# Patient Record
Sex: FEMALE | Race: WHITE | Employment: OTHER | URBAN - METROPOLITAN AREA
[De-identification: names, ages, dates, MRNs, and addresses within clinical notes are randomized per-mention and may not be internally consistent; named-entity substitution may affect disease eponyms.]

---

## 2019-04-18 ENCOUNTER — APPOINTMENT (OUTPATIENT)
Dept: CT IMAGING | Age: 49
End: 2019-04-18
Payer: OTHER MISCELLANEOUS

## 2019-04-18 ENCOUNTER — HOSPITAL ENCOUNTER (EMERGENCY)
Age: 49
Discharge: HOME | End: 2019-04-18
Attending: EMERGENCY MEDICINE
Payer: OTHER MISCELLANEOUS

## 2019-04-18 VITALS
OXYGEN SATURATION: 99 % | SYSTOLIC BLOOD PRESSURE: 117 MMHG | RESPIRATION RATE: 7 BRPM | TEMPERATURE: 98.2 F | HEART RATE: 88 BPM | DIASTOLIC BLOOD PRESSURE: 70 MMHG
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
## 2019-04-18 DIAGNOSIS — S09.90XA INJURY OF HEAD, INITIAL ENCOUNTER: ICD-10-CM

## 2019-04-18 DIAGNOSIS — R40.0 DROWSINESS: ICD-10-CM

## 2019-04-18 PROCEDURE — 72125 CT NECK SPINE W/O DYE: CPT

## 2019-04-18 PROCEDURE — 2580000003 HC RX 258: Performed by: NURSE PRACTITIONER

## 2019-04-18 PROCEDURE — 99284 EMERGENCY DEPT VISIT MOD MDM: CPT

## 2019-04-18 PROCEDURE — 70450 CT HEAD/BRAIN W/O DYE: CPT

## 2019-04-18 RX ORDER — MELOXICAM 15 MG/1
15 TABLET ORAL DAILY
COMMUNITY

## 2019-04-18 RX ORDER — LORAZEPAM 1 MG/1
1 TABLET ORAL 4 TIMES DAILY
COMMUNITY

## 2019-04-18 RX ORDER — MORPHINE SULFATE 15 MG/1
15 TABLET ORAL
COMMUNITY

## 2019-04-18 RX ORDER — 0.9 % SODIUM CHLORIDE 0.9 %
1000 INTRAVENOUS SOLUTION INTRAVENOUS ONCE
Status: COMPLETED | OUTPATIENT
Start: 2019-04-18 | End: 2019-04-18

## 2019-04-18 RX ORDER — OXYMORPHONE HYDROCHLORIDE 5 MG/1
5 TABLET ORAL EVERY 8 HOURS PRN
COMMUNITY

## 2019-04-18 RX ORDER — PREDNISONE 1 MG/1
TABLET ORAL SEE ADMIN INSTRUCTIONS
COMMUNITY
Start: 2019-04-12 | End: 2019-04-20

## 2019-04-18 RX ORDER — TIZANIDINE 4 MG/1
4 TABLET ORAL EVERY 8 HOURS PRN
COMMUNITY

## 2019-04-18 RX ORDER — FAMOTIDINE 20 MG/1
20 TABLET, FILM COATED ORAL 2 TIMES DAILY
COMMUNITY

## 2019-04-18 RX ADMIN — SODIUM CHLORIDE 1000 ML: 9 INJECTION, SOLUTION INTRAVENOUS at 11:26

## 2019-04-18 ASSESSMENT — PAIN DESCRIPTION - ONSET: ONSET: ON-GOING

## 2019-04-18 ASSESSMENT — ENCOUNTER SYMPTOMS
NAUSEA: 0
COLOR CHANGE: 0
SHORTNESS OF BREATH: 0
EYE PAIN: 0
FACIAL SWELLING: 0
COUGH: 0
ABDOMINAL PAIN: 0
VOMITING: 0
BACK PAIN: 0
PHOTOPHOBIA: 0

## 2019-04-18 ASSESSMENT — PAIN SCALES - GENERAL: PAINLEVEL_OUTOF10: 3

## 2019-04-18 ASSESSMENT — PAIN DESCRIPTION - FREQUENCY: FREQUENCY: CONTINUOUS

## 2019-04-18 ASSESSMENT — PAIN DESCRIPTION - DESCRIPTORS: DESCRIPTORS: ACHING

## 2019-04-18 ASSESSMENT — PAIN DESCRIPTION - LOCATION: LOCATION: HEAD;NECK

## 2019-04-18 ASSESSMENT — PAIN DESCRIPTION - PROGRESSION: CLINICAL_PROGRESSION: NOT CHANGED

## 2019-04-18 NOTE — ED NOTES
Patient remains drowsy but responsive. Speech remains slightly slurred  But coherent. . doctor Ginna Lehman notified of patients low blood pressure and  Continued drowsiness Intravenous fluids were started.       Nick Johnson RN  04/18/19 4243

## 2019-04-18 NOTE — ED NOTES
Abrasion right lower arm washed with soap/h20. . Neosporin tiny and bandaid  applied     Belinda Farooq RN  04/18/19 3083

## 2019-04-18 NOTE — ED NOTES
Patient re-evaluated by doctor Bertha Nielson. Patient being prepared for discharge.       Florinda Allan RN  04/18/19 1738
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
## 2019-04-18 NOTE — ED NOTES
Regular diet given. Patient remains drowsy but is feeding herself.       Griselda Ruvalcaba, RN  04/18/19 0544

## 2019-04-18 NOTE — PROGRESS NOTES
Transitions of Care Pharmacy Service   Medication Review    The patient's list of current home medications has been reviewed and updated. Source(s) of information: Self, [facility]    No home meds were listed at admission, all added at this time. Please feel free to call with any questions about this encounter. Thank you. Lorena Whitman, [address] [facility]  Transitions of Care Pharmacy Service  Phone:  [phone]  Fax: [phone]      Prior to Admission medications    Medication Sig Start Date End Date Taking? Authorizing Provider   diclofenac (FLECTOR) 1.3 % patch Place 1 patch onto the skin 2 times daily   Yes Historical Provider, MD   morphine-naltrexone (EMBEDA) 60-2.4 MG CPCR Take 1 capsule by mouth every 12 hours. Yes Historical Provider, MD   oxymorphone (OPANA) 5 MG tablet Take 5 mg by mouth every 8 hours as needed for Pain. Yes Historical Provider, MD   LORazepam (ATIVAN) 1 MG tablet Take 1 mg by mouth 4 times daily. Yes Historical Provider, MD   tiZANidine (ZANAFLEX) 4 MG tablet Take 4 mg by mouth every 8 hours as needed (muscle spasms)   Yes Historical Provider, MD   morphine (MSIR) 15 MG tablet Take 15 mg by mouth every 6-8 hours as needed for Pain.    Yes Historical Provider, MD   meloxicam (MOBIC) 15 MG tablet Take 15 mg by mouth daily   Yes Historical Provider, MD   predniSONE (DELTASONE) 5 MG tablet Take by mouth See Admin Instructions 3 tabs daily x 3 days, 2 tabs daily x 3 days, 1 tab daily x 3 days 4/12/19 4/20/19 Yes Historical Provider, MD   famotidine (PEPCID) 20 MG tablet Take 20 mg by mouth 2 times daily   Yes Historical Provider, MD
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
## 2019-04-18 NOTE — ED NOTES
Patient remains drowsy but respondswhen name called. Respiratory rate  Is 7-9  Per minute . sao2 remains in the 90's. fredy carlson notifed.      Maria Alejandra Welch RN  04/18/19 9556

## 2023-01-31 ENCOUNTER — NURSE TRIAGE (OUTPATIENT)
Dept: OTHER | Facility: CLINIC | Age: 53
End: 2023-01-31

## 2023-01-31 NOTE — TELEPHONE ENCOUNTER
Location of patient: [place]    Received call from [facility] at .  ([facility]; Patient with Red Flag Complaint requesting to establish care with [facility]/[place]. Subjective: Caller states \"I thought I had a rash but I was told that it was ringworm, this was last year. \"     Current Symptoms: rash    Patient was seen and diagnosed with ring worm and it is better but never fully went away. Patient is moving and wants to establish care in [facility].

## 2023-03-17 ENCOUNTER — TELEPHONE (OUTPATIENT)
Dept: FAMILY MEDICINE CLINIC | Age: 53
End: 2023-03-17